# Patient Record
Sex: MALE | Race: WHITE | NOT HISPANIC OR LATINO | URBAN - METROPOLITAN AREA
[De-identification: names, ages, dates, MRNs, and addresses within clinical notes are randomized per-mention and may not be internally consistent; named-entity substitution may affect disease eponyms.]

---

## 2018-02-23 ENCOUNTER — INPATIENT HOSPITAL (OUTPATIENT)
Dept: URBAN - METROPOLITAN AREA HOSPITAL 76 | Facility: HOSPITAL | Age: 53
End: 2018-02-23
Payer: COMMERCIAL

## 2018-02-23 DIAGNOSIS — K72.00 ACUTE AND SUBACUTE HEPATIC FAILURE WITHOUT COMA: ICD-10-CM

## 2018-02-23 DIAGNOSIS — K92.0 HEMATEMESIS: ICD-10-CM

## 2018-02-23 PROCEDURE — 99222 1ST HOSP IP/OBS MODERATE 55: CPT | Performed by: INTERNAL MEDICINE

## 2018-02-24 PROCEDURE — 99231 SBSQ HOSP IP/OBS SF/LOW 25: CPT | Performed by: INTERNAL MEDICINE

## 2019-07-01 ENCOUNTER — TRANSCRIBE ORDERS (OUTPATIENT)
Dept: ADMINISTRATIVE | Facility: HOSPITAL | Age: 54
End: 2019-07-01

## 2019-07-01 DIAGNOSIS — R10.11 RUQ ABDOMINAL PAIN: Primary | ICD-10-CM

## 2019-07-01 DIAGNOSIS — R14.0 BLOATING: ICD-10-CM

## 2019-07-11 ENCOUNTER — HOSPITAL ENCOUNTER (OUTPATIENT)
Dept: ULTRASOUND IMAGING | Facility: HOSPITAL | Age: 54
Discharge: HOME | End: 2019-07-11
Admitting: SURGERY

## 2019-07-11 ENCOUNTER — HOSPITAL ENCOUNTER (OUTPATIENT)
Dept: NUCLEAR MEDICINE | Facility: HOSPITAL | Age: 54
Discharge: HOME | End: 2019-07-11

## 2019-07-11 DIAGNOSIS — R10.11 RUQ ABDOMINAL PAIN: ICD-10-CM

## 2019-07-11 DIAGNOSIS — R14.0 BLOATING: ICD-10-CM

## 2019-07-11 PROCEDURE — 78227 HEPATOBIL SYST IMAGE W/DRUG: CPT

## 2019-07-11 PROCEDURE — 76705 ECHO EXAM OF ABDOMEN: CPT

## 2019-07-11 PROCEDURE — A9537 TC99M MEBROFENIN: HCPCS | Performed by: SURGERY

## 2019-07-11 PROCEDURE — 0 TECHNETIUM TC 99M MEBROFENIN KIT: Performed by: SURGERY

## 2019-07-11 RX ORDER — KIT FOR THE PREPARATION OF TECHNETIUM TC 99M MEBROFENIN 45 MG/10ML
1 INJECTION, POWDER, LYOPHILIZED, FOR SOLUTION INTRAVENOUS
Status: COMPLETED | OUTPATIENT
Start: 2019-07-11 | End: 2019-07-11

## 2019-07-11 RX ADMIN — MEBROFENIN 1 DOSE: 45 INJECTION, POWDER, LYOPHILIZED, FOR SOLUTION INTRAVENOUS at 11:35

## 2023-01-01 ENCOUNTER — HOSPITAL ENCOUNTER (EMERGENCY)
Facility: HOSPITAL | Age: 58
End: 2023-07-29
Attending: EMERGENCY MEDICINE
Payer: MEDICAID

## 2023-01-01 VITALS — WEIGHT: 220 LBS | RESPIRATION RATE: 23 BRPM | BODY MASS INDEX: 34.53 KG/M2 | HEART RATE: 122 BPM | HEIGHT: 67 IN

## 2023-01-01 DIAGNOSIS — I46.9 CARDIOPULMONARY ARREST: Primary | ICD-10-CM

## 2023-01-01 LAB
HOLD SPECIMEN: NORMAL
HOLD SPECIMEN: NORMAL
WHOLE BLOOD HOLD COAG: NORMAL
WHOLE BLOOD HOLD SPECIMEN: NORMAL

## 2023-01-01 PROCEDURE — 25010000002 MAGNESIUM SULFATE IN D5W 1G/100ML (PREMIX) 1-5 GM/100ML-% SOLUTION: Performed by: EMERGENCY MEDICINE

## 2023-01-01 PROCEDURE — 31500 INSERT EMERGENCY AIRWAY: CPT

## 2023-01-01 PROCEDURE — 92950 HEART/LUNG RESUSCITATION CPR: CPT

## 2023-01-01 PROCEDURE — 25010000002 EPINEPHRINE 1 MG/10ML SOLUTION PREFILLED SYRINGE: Performed by: EMERGENCY MEDICINE

## 2023-01-01 PROCEDURE — 99285 EMERGENCY DEPT VISIT HI MDM: CPT

## 2023-01-01 PROCEDURE — 25010000002 AMIODARONE PER 30 MG: Performed by: EMERGENCY MEDICINE

## 2023-01-01 RX ORDER — EPINEPHRINE IN SOD CHLOR,ISO 1 MG/10 ML
SYRINGE (ML) INTRAVENOUS
Status: COMPLETED | OUTPATIENT
Start: 2023-01-01 | End: 2023-01-01

## 2023-01-01 RX ORDER — CALCIUM CHLORIDE 100 MG/ML
INJECTION INTRAVENOUS; INTRAVENTRICULAR
Status: COMPLETED | OUTPATIENT
Start: 2023-01-01 | End: 2023-01-01

## 2023-01-01 RX ORDER — MAGNESIUM SULFATE 1 G/100ML
INJECTION INTRAVENOUS
Status: COMPLETED | OUTPATIENT
Start: 2023-01-01 | End: 2023-01-01

## 2023-01-01 RX ORDER — SODIUM CHLORIDE 9 MG/ML
INJECTION, SOLUTION INTRAVENOUS
Status: COMPLETED | OUTPATIENT
Start: 2023-01-01 | End: 2023-01-01

## 2023-01-01 RX ORDER — AMIODARONE HYDROCHLORIDE 50 MG/ML
INJECTION, SOLUTION INTRAVENOUS
Status: COMPLETED | OUTPATIENT
Start: 2023-01-01 | End: 2023-01-01

## 2023-01-01 RX ORDER — MAGNESIUM SULFATE 1 G/100ML
INJECTION INTRAVENOUS
Status: DISCONTINUED
Start: 2023-01-01 | End: 2023-07-29 | Stop reason: HOSPADM

## 2023-01-01 RX ADMIN — SODIUM CHLORIDE 100 ML/HR: 9 INJECTION, SOLUTION INTRAVENOUS at 22:51

## 2023-01-01 RX ADMIN — MAGNESIUM SULFATE IN DEXTROSE 1 G: 10 INJECTION, SOLUTION INTRAVENOUS at 22:41

## 2023-01-01 RX ADMIN — SODIUM BICARBONATE 50 MEQ: 84 INJECTION, SOLUTION INTRAVENOUS at 22:35

## 2023-01-01 RX ADMIN — CALCIUM CHLORIDE 1 G: 100 INJECTION, SOLUTION INTRAVENOUS at 22:38

## 2023-01-01 RX ADMIN — EPINEPHRINE 1 MG: 0.1 INJECTION, SOLUTION ENDOTRACHEAL; INTRACARDIAC; INTRAVENOUS at 22:38

## 2023-01-01 RX ADMIN — AMIODARONE HYDROCHLORIDE 300 MG: 50 INJECTION, SOLUTION INTRAVENOUS at 22:40

## 2023-01-01 RX ADMIN — EPINEPHRINE 1 MG: 0.1 INJECTION, SOLUTION ENDOTRACHEAL; INTRACARDIAC; INTRAVENOUS at 22:36

## 2023-01-01 RX ADMIN — EPINEPHRINE 1 MG: 0.1 INJECTION, SOLUTION ENDOTRACHEAL; INTRACARDIAC; INTRAVENOUS at 22:42

## 2023-01-01 RX ADMIN — EPINEPHRINE 1 MG: 0.1 INJECTION, SOLUTION ENDOTRACHEAL; INTRACARDIAC; INTRAVENOUS at 22:49

## 2023-01-01 RX ADMIN — EPINEPHRINE 1 MG: 0.1 INJECTION, SOLUTION ENDOTRACHEAL; INTRACARDIAC; INTRAVENOUS at 22:52

## 2023-01-01 RX ADMIN — SODIUM BICARBONATE 50 MEQ: 84 INJECTION, SOLUTION INTRAVENOUS at 22:36

## 2023-01-01 RX ADMIN — EPINEPHRINE 1 MG: 0.1 INJECTION, SOLUTION ENDOTRACHEAL; INTRACARDIAC; INTRAVENOUS at 22:46

## 2023-07-29 NOTE — ED PROVIDER NOTES
Subjective   History of Present Illness  Chief complaint: Patient is a 57-year-old who was brought in in full arrest.  Per EMS who provide the history he was witness syncopal event.  He went unresponsive.  He was in PEA and then into V-fib.  His own defibrillator did defibrillate a few times.  EMS defibrillated 4 times as well.  He received 3 epinephrines in route.  Presents following that.  Accu-Chek was 250.    Context:    Duration:    Timing:    Severity:    Associated Symptoms:        PCP:  LMP:    Review of Systems   Unable to perform ROS: Other     No past medical history on file.    No Known Allergies    No past surgical history on file.    No family history on file.    Social History     Socioeconomic History    Marital status:            Objective   Physical Exam  Vitals and nursing note reviewed.   HENT:      Head: Normocephalic.   Eyes:      Comments: Fixed    Pulmonary:      Comments: Breath sounds with bagging  Abdominal:      General: There is distension.   Musculoskeletal:         General: Swelling present.   Skin:     General: Skin is dry.   Neurological:      Comments: Patient unresponsive   Psychiatric:      Comments: Patient unresponsive       Intubation    Date/Time: 7/28/2023 11:07 PM  Performed by: Ronnie Wilkerson DO  Authorized by: Ronnie Wilkerson DO     Consent:     Consent obtained:  Emergent situation  Pre-procedure details:     Indication: failure to oxygenate, failure to protect airway and failure to ventilate      Patient status:  Unresponsive    Look externally: facial hair and large tongue      Obstruction: none      Neck mobility: normal      Induction agents:  None  Procedure details:     Preoxygenation:  Supraglottic device    CPR in progress: yes      Intubation method:  Oral    Intubation technique: video assisted      Laryngoscope size: lopro s4.    Tube type:  Cuffed    Number of attempts:  1    Tube visualized through cords: yes    Placement assessment:      ETT at teeth/gumline (cm):  28    Breath sounds:  Reduced on left    Placement verification: chest rise and colorimetric ETCO2    Post-procedure details:     Procedure completion:  Tolerated well, no immediate complications  Comments:      Following intubation decreased breath sounds on the left.  Tube was moved back to to 26.  Adequate bilateral breath sounds at that point.           ED Course                                           Medical Decision Making  Patient was seen after cardiac arrest    Differential diagnose includes was not limited to decompensated heart failure, arrhythmia, ACS, PE, pneumothorax, aortic dissection, aortic aneurysm rupture, brain aneurysm    I did speak with family.  They did state that the patient had been short of breath for a few days.  He saw an outlying emergency room and was treated with steroids and breathing treatments.  But he continued to swell.  He has a history of decompensated heart failure.  Per progressively as shortness of breath became worse and acutely so tonight.  He collapsed and turned purple and went unresponsive.  His family started CPR at that time.  EMS was called and transported in route.  He was in PEA and V-fib here.  He had multiple defibrillations as well as medications.  See code sheet.  After patient's transport of approximately 15 minutes by EMS and prolonged resuscitation here in the emergency department I did obtain ultrasound of heart at bedside.  No signs of cardiac activity.  Patient did remain in a paced rhythm but was pulseless.  And at this point in time fixed pupils no corneal reflex.  No respirations and no pulse or cardiac activity.  Time of death was called at 10:55 PM.  Discussed at length with the family as well as the .    Problems Addressed:  Cardiopulmonary arrest: complicated acute illness or injury    Risk  Prescription drug management.        Final diagnoses:   Cardiopulmonary arrest       ED Disposition  ED Disposition        ED Disposition       Condition   --    Comment   --               No follow-up provider specified.       Medication List      No changes were made to your prescriptions during this visit.            Ronnie Wilkerson,   [phone]